# Patient Record
Sex: FEMALE
[De-identification: names, ages, dates, MRNs, and addresses within clinical notes are randomized per-mention and may not be internally consistent; named-entity substitution may affect disease eponyms.]

---

## 2017-02-22 ENCOUNTER — HOSPITAL ENCOUNTER (OUTPATIENT)
Dept: HOSPITAL 5 - SPVWC | Age: 62
Discharge: HOME | End: 2017-02-22
Attending: GENERAL PRACTICE
Payer: COMMERCIAL

## 2017-02-22 DIAGNOSIS — Z12.31: Primary | ICD-10-CM

## 2017-02-22 PROCEDURE — G0202 SCR MAMMO BI INCL CAD: HCPCS

## 2017-02-22 PROCEDURE — 77067 SCR MAMMO BI INCL CAD: CPT

## 2017-02-23 NOTE — MAMMOGRAPHY REPORT
BILATERAL DIGITAL SCREENING MAMMOGRAM with CAD: 02/22/17 09:41:00



CLINICAL: Routine screening.



COMPARISON: None available.



FINDINGS: There are bilateral scattered areas of fibroglandular 

density.No mass, architectural distortion or suspicious calcifications.



IMPRESSION: No mammographic evidence of malignancy.



BI-RADS CATEGORY:  1 -- Negative



RECOMMENDATION: Routine mammographic screening in one year.



COMMENT:

Patient follow-up letters are generated by our [employer] application.

## 2022-04-14 ENCOUNTER — HOSPITAL ENCOUNTER (OUTPATIENT)
Dept: HOSPITAL 5 - CT | Age: 67
Discharge: HOME | End: 2022-04-14
Attending: OBSTETRICS & GYNECOLOGY
Payer: MEDICARE

## 2022-04-14 DIAGNOSIS — K76.89: ICD-10-CM

## 2022-04-14 DIAGNOSIS — Z90.49: ICD-10-CM

## 2022-04-14 DIAGNOSIS — Z90.710: ICD-10-CM

## 2022-04-14 DIAGNOSIS — C54.1: Primary | ICD-10-CM

## 2022-04-14 DIAGNOSIS — M47.816: ICD-10-CM

## 2022-04-14 DIAGNOSIS — I74.5: ICD-10-CM

## 2022-04-14 PROCEDURE — 71260 CT THORAX DX C+: CPT

## 2022-04-14 PROCEDURE — 74177 CT ABD & PELVIS W/CONTRAST: CPT

## 2022-04-14 NOTE — CAT SCAN REPORT
CT CHEST, ABDOMEN, AND PELVIS WITH CONTRAST



INDICATION / CLINICAL INFORMATION: Malignant neoplasm of endometrium   100 ml omni 300.



TECHNIQUE: Axial CT images were obtained through the chest, abdomen, and pelvis after 100 cc of Omnip
aque 300 IV contrast. All CT scans at this location are performed using CT dose reduction for ALARA b
y means of automated exposure control. 



COMPARISON: None available.



FINDINGS:

HEART: No significant abnormality.

CORONARY ARTERY CALCIFICATION: None.

THORACIC AORTA: No significant abnormality. 

MEDIASTINUM / ROSEANN: No significant abnormality.

PLEURA: No pleural effusion. No pneumothorax.

LUNGS: No acute air space or interstitial disease. No suspicious pulmonary lesion is detected.

ADDITIONAL CHEST FINDINGS: None.



LIVER: No significant abnormality. 2 small cysts measuring up to 1 cm in the left hepatic lobe are no
susan.

GALLBLADDER: Surgically absent.  

BILE DUCTS: No significant abnormality.

PANCREAS: No significant abnormality.

SPLEEN: No significant abnormality.

ADRENALS: No significant abnormality.

RIGHT KIDNEY / URETER: No significant abnormality.

LEFT KIDNEY / URETER: No significant abnormality.



STOMACH and SMALL BOWEL: No significant abnormality. 

COLON: No significant abnormality. 

APPENDIX: No significant abnormality.  

PERITONEUM: No free fluid. No free air. No fluid collection.

LYMPH NODES: No pathologic adenopathy is detected.

AORTA / ARTERIES: There is moderate to severe stenosis in the right common iliac artery. There is occ
lusion of the left common iliac artery. Distal flow is reconstituted by collateral vessels. There are
 moderate to severe atherosclerotic plaques throughout the abdominal aorta. 

IVC / VEINS: No significant abnormality.



URINARY BLADDER: No significant abnormality.

REPRODUCTIVE ORGANS: Hysterectomy changes are evident. The vaginal cuff is unremarkable. Adnexal graciela
ons are unremarkable.



ADDITIONAL FINDINGS: None.



SKELETAL SYSTEM: No suspicious bony lesion. Moderate degenerative changes are noted in the lumbar spi
ne. Grade 1 anterolisthesis of L4 with respect L5.



IMPRESSION:

Hysterectomy. No evidence for recurrent or metastatic disease in the chest, abdomen or pelvis.

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 The left common
 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

Cholecystectomy.

Small liver cysts.

Degenerative findings in the lumbar spine.



Signer Name: Patrick Dc Jr, MD 

Signed: 4/14/2022 1:59 PM

Workstation Name: [id number]

## 2022-04-14 NOTE — CAT SCAN REPORT
CT CHEST, ABDOMEN, AND PELVIS WITH CONTRAST



INDICATION / CLINICAL INFORMATION: Malignant neoplasm of endometrium   100 ml omni 300.



TECHNIQUE: Axial CT images were obtained through the chest, abdomen, and pelvis after 100 cc of Omnip
aque 300 IV contrast. All CT scans at this location are performed using CT dose reduction for ALARA b
y means of automated exposure control. 



COMPARISON: None available.



FINDINGS:

HEART: No significant abnormality.

CORONARY ARTERY CALCIFICATION: None.

THORACIC AORTA: No significant abnormality. 

MEDIASTINUM / ROSEANN: No significant abnormality.

PLEURA: No pleural effusion. No pneumothorax.

LUNGS: No acute air space or interstitial disease. No suspicious pulmonary lesion is detected.

ADDITIONAL CHEST FINDINGS: None.



LIVER: No significant abnormality. 2 small cysts measuring up to 1 cm in the left hepatic lobe are no
susan.

GALLBLADDER: Surgically absent.  

BILE DUCTS: No significant abnormality.

PANCREAS: No significant abnormality.

SPLEEN: No significant abnormality.

ADRENALS: No significant abnormality.

RIGHT KIDNEY / URETER: No significant abnormality.

LEFT KIDNEY / URETER: No significant abnormality.



STOMACH and SMALL BOWEL: No significant abnormality. 

COLON: No significant abnormality. 

APPENDIX: No significant abnormality.  

PERITONEUM: No free fluid. No free air. No fluid collection.

LYMPH NODES: No pathologic adenopathy is detected.

AORTA / ARTERIES: There is moderate to severe stenosis in the right common iliac artery. There is occ
lusion of the left common iliac artery. Distal flow is reconstituted by collateral vessels. There are
 moderate to severe atherosclerotic plaques throughout the abdominal aorta. 

IVC / VEINS: No significant abnormality.



URINARY BLADDER: No significant abnormality.

REPRODUCTIVE ORGANS: Hysterectomy changes are evident. The vaginal cuff is unremarkable. Adnexal graciela
ons are unremarkable.



ADDITIONAL FINDINGS: None.



SKELETAL SYSTEM: No suspicious bony lesion. Moderate degenerative changes are noted in the lumbar spi
ne. Grade 1 anterolisthesis of L4 with respect L5.



IMPRESSION:

Hysterectomy. No evidence for recurrent or metastatic disease in the chest, abdomen or pelvis.

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 The left common
 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

Cholecystectomy.

Small liver cysts.

Degenerative findings in the lumbar spine.



Signer Name: Patrick Dc Jr, MD 

Signed: 4/14/2022 1:59 PM

Workstation Name: [id number]

## 2024-07-10 ENCOUNTER — OFFICE VISIT (OUTPATIENT)
Dept: URBAN - METROPOLITAN AREA CLINIC 109 | Facility: CLINIC | Age: 69
End: 2024-07-10
Payer: MEDICARE

## 2024-07-10 ENCOUNTER — DASHBOARD ENCOUNTERS (OUTPATIENT)
Age: 69
End: 2024-07-10

## 2024-07-10 VITALS
TEMPERATURE: 97.5 F | SYSTOLIC BLOOD PRESSURE: 117 MMHG | BODY MASS INDEX: 29.82 KG/M2 | HEART RATE: 71 BPM | DIASTOLIC BLOOD PRESSURE: 73 MMHG | HEIGHT: 67 IN | WEIGHT: 190 LBS

## 2024-07-10 DIAGNOSIS — Z86.010 PERSONAL HISTORY OF COLONIC POLYPS: ICD-10-CM

## 2024-07-10 DIAGNOSIS — K59.09 CHRONIC CONSTIPATION: ICD-10-CM

## 2024-07-10 PROCEDURE — 99203 OFFICE O/P NEW LOW 30 MIN: CPT

## 2024-07-10 RX ORDER — ATORVASTATIN CALCIUM 20 MG/1
1 TABLET TABLET, FILM COATED ORAL ONCE A DAY
Status: ACTIVE | COMMUNITY

## 2024-07-10 NOTE — HPI-TODAY'S VISIT:
Patient is a 68 yo female who presents today referred by PCP for colonoscopy consult. Per referral hx adenomatous polyps. No records of last colonoscopy in chart or EPIC. Per pt, had colonoscopy with Dr. Hunter showing 2 large precancerous polyps and remembers being told to repeat in 3 years, specifically this upcoming January. Denies FHx of colon cancer. Denies GI bleeding, N/V, abdominal pain, unintentional weight loss, or changes in bowel habits. Reports chronic constipation. Baseline hard stool w/ straining q 2 days. Improving some w/ otc laxative use. No UGI symptoms. No heart, lung, or renal conditions. No blood thinners.